# Patient Record
Sex: FEMALE | Race: WHITE
[De-identification: names, ages, dates, MRNs, and addresses within clinical notes are randomized per-mention and may not be internally consistent; named-entity substitution may affect disease eponyms.]

---

## 2020-08-17 ENCOUNTER — HOSPITAL ENCOUNTER (INPATIENT)
Dept: HOSPITAL 19 - LDRO | Age: 30
LOS: 2 days | Discharge: HOME | End: 2020-08-19
Payer: COMMERCIAL

## 2020-08-17 VITALS — DIASTOLIC BLOOD PRESSURE: 70 MMHG | HEART RATE: 65 BPM | SYSTOLIC BLOOD PRESSURE: 138 MMHG

## 2020-08-17 VITALS — DIASTOLIC BLOOD PRESSURE: 76 MMHG | HEART RATE: 72 BPM | TEMPERATURE: 98.2 F | SYSTOLIC BLOOD PRESSURE: 137 MMHG

## 2020-08-17 VITALS — DIASTOLIC BLOOD PRESSURE: 71 MMHG | HEART RATE: 71 BPM | SYSTOLIC BLOOD PRESSURE: 139 MMHG

## 2020-08-17 VITALS — DIASTOLIC BLOOD PRESSURE: 40 MMHG | SYSTOLIC BLOOD PRESSURE: 140 MMHG | HEART RATE: 78 BPM

## 2020-08-17 VITALS — DIASTOLIC BLOOD PRESSURE: 65 MMHG | HEART RATE: 72 BPM | SYSTOLIC BLOOD PRESSURE: 140 MMHG

## 2020-08-17 VITALS — HEART RATE: 77 BPM | DIASTOLIC BLOOD PRESSURE: 69 MMHG | SYSTOLIC BLOOD PRESSURE: 144 MMHG

## 2020-08-17 VITALS — DIASTOLIC BLOOD PRESSURE: 63 MMHG | SYSTOLIC BLOOD PRESSURE: 136 MMHG | HEART RATE: 68 BPM

## 2020-08-17 VITALS — WEIGHT: 253.53 LBS | HEIGHT: 64.06 IN | BODY MASS INDEX: 43.28 KG/M2

## 2020-08-17 VITALS — SYSTOLIC BLOOD PRESSURE: 140 MMHG | DIASTOLIC BLOOD PRESSURE: 69 MMHG | HEART RATE: 68 BPM

## 2020-08-17 VITALS — DIASTOLIC BLOOD PRESSURE: 82 MMHG | SYSTOLIC BLOOD PRESSURE: 144 MMHG | HEART RATE: 88 BPM

## 2020-08-17 VITALS — HEART RATE: 81 BPM | SYSTOLIC BLOOD PRESSURE: 140 MMHG | DIASTOLIC BLOOD PRESSURE: 45 MMHG

## 2020-08-17 VITALS — TEMPERATURE: 98.7 F | SYSTOLIC BLOOD PRESSURE: 135 MMHG | HEART RATE: 80 BPM | DIASTOLIC BLOOD PRESSURE: 80 MMHG

## 2020-08-17 VITALS — SYSTOLIC BLOOD PRESSURE: 130 MMHG | DIASTOLIC BLOOD PRESSURE: 66 MMHG | HEART RATE: 69 BPM | TEMPERATURE: 98.4 F

## 2020-08-17 VITALS — HEART RATE: 69 BPM | DIASTOLIC BLOOD PRESSURE: 75 MMHG | SYSTOLIC BLOOD PRESSURE: 135 MMHG

## 2020-08-17 VITALS — HEART RATE: 87 BPM | SYSTOLIC BLOOD PRESSURE: 141 MMHG | DIASTOLIC BLOOD PRESSURE: 84 MMHG

## 2020-08-17 VITALS — DIASTOLIC BLOOD PRESSURE: 77 MMHG | SYSTOLIC BLOOD PRESSURE: 127 MMHG | HEART RATE: 75 BPM

## 2020-08-17 VITALS — DIASTOLIC BLOOD PRESSURE: 57 MMHG | SYSTOLIC BLOOD PRESSURE: 127 MMHG | HEART RATE: 75 BPM

## 2020-08-17 VITALS — TEMPERATURE: 98.1 F | HEART RATE: 75 BPM | DIASTOLIC BLOOD PRESSURE: 73 MMHG | SYSTOLIC BLOOD PRESSURE: 130 MMHG

## 2020-08-17 VITALS — SYSTOLIC BLOOD PRESSURE: 135 MMHG | HEART RATE: 80 BPM | DIASTOLIC BLOOD PRESSURE: 71 MMHG

## 2020-08-17 VITALS — HEART RATE: 75 BPM | DIASTOLIC BLOOD PRESSURE: 76 MMHG | SYSTOLIC BLOOD PRESSURE: 127 MMHG

## 2020-08-17 DIAGNOSIS — O34.211: Primary | ICD-10-CM

## 2020-08-17 DIAGNOSIS — J45.909: ICD-10-CM

## 2020-08-17 DIAGNOSIS — E03.9: ICD-10-CM

## 2020-08-17 DIAGNOSIS — Z3A.38: ICD-10-CM

## 2020-08-17 DIAGNOSIS — E66.9: ICD-10-CM

## 2020-08-17 DIAGNOSIS — K21.9: ICD-10-CM

## 2020-08-17 LAB
BASOPHILS # BLD: 0 10*3/UL (ref 0–0.2)
BASOPHILS NFR BLD AUTO: 0.2 % (ref 0–2)
EOSINOPHIL # BLD: 0.1 10*3/UL (ref 0–0.7)
EOSINOPHIL NFR BLD: 0.9 % (ref 0–4)
ERYTHROCYTE [DISTWIDTH] IN BLOOD BY AUTOMATED COUNT: 15.8 % (ref 11.5–14.5)
GRANULOCYTES # BLD AUTO: 71.7 % (ref 42.2–75.2)
HCT VFR BLD AUTO: 37 % (ref 37–47)
HGB BLD-MCNC: 11.5 G/DL (ref 12.5–16)
LYMPHOCYTES # BLD: 2.4 10*3/UL (ref 1.2–3.4)
LYMPHOCYTES NFR BLD: 21 % (ref 20–51)
MCH RBC QN AUTO: 26 PG (ref 27–31)
MCHC RBC AUTO-ENTMCNC: 31 G/DL (ref 33–37)
MCV RBC AUTO: 82 FL (ref 80–100)
MONOCYTES # BLD: 0.6 10*3/UL (ref 0.1–0.6)
MONOCYTES NFR BLD AUTO: 5.7 % (ref 1.7–9.3)
NEUTROPHILS # BLD: 8.1 10*3/UL (ref 1.4–6.5)
PLATELET # BLD AUTO: 186 K/MM3 (ref 130–400)
PMV BLD AUTO: 10.5 FL (ref 7.4–10.4)
RBC # BLD AUTO: 4.49 M/MM3 (ref 4.1–5.3)

## 2020-08-17 NOTE — NUR
FHR baseline 135-140bpm and subtle late deceleration noted after contraction
and returning to baseline.
1015: FHR tracing moderate variability with recurrent late decelerations and
Dr. Damon at nurses station reviewing FHR strip and no new orders given.
 
1102: Patient off monitor to void.
 
1150: Lorenzo DILL at bedside discussing plan and risk factors.
 
1155: Patient off monitor and ambulates to OR.

## 2020-08-17 NOTE — NUR
Patient ambulatory to [facility]5 with spouse, changed into gown, FHR/TOCO monitors
placed and explained. Patient states that she has been heber 0200 am.
Denies any leaking of fluid/vaginal bleeding/decreased fetal movement.
Assessment completed and questions answered.

## 2020-08-17 NOTE — NUR
Dr. Damon at nurses station and updated on patient. See physician notification.
Physician reviewing FHR strip and orders to watch patient longer until any
further orders.
 
1010: Dr. Damon at bedside and discussing the plan for  and patient
agrees with plan of care.
1015: IV started per Olinda WALL, blood drawn and to lab, LR infusing.
Consents gone over and signed/postpartum packet given/plan of care discussed.
 
Patient prepped for .

## 2020-08-18 VITALS — HEART RATE: 72 BPM | SYSTOLIC BLOOD PRESSURE: 122 MMHG | DIASTOLIC BLOOD PRESSURE: 61 MMHG

## 2020-08-18 VITALS — SYSTOLIC BLOOD PRESSURE: 123 MMHG | HEART RATE: 77 BPM | TEMPERATURE: 98.4 F | DIASTOLIC BLOOD PRESSURE: 57 MMHG

## 2020-08-18 VITALS — HEART RATE: 68 BPM | SYSTOLIC BLOOD PRESSURE: 120 MMHG | DIASTOLIC BLOOD PRESSURE: 58 MMHG | TEMPERATURE: 97.6 F

## 2020-08-18 VITALS — DIASTOLIC BLOOD PRESSURE: 49 MMHG | HEART RATE: 62 BPM | TEMPERATURE: 97.7 F | SYSTOLIC BLOOD PRESSURE: 94 MMHG

## 2020-08-18 VITALS — SYSTOLIC BLOOD PRESSURE: 138 MMHG | HEART RATE: 59 BPM | DIASTOLIC BLOOD PRESSURE: 58 MMHG | TEMPERATURE: 97.9 F

## 2020-08-18 NOTE — NUR
Initial visit; Parents thanked  for offering congratulations and God's
blessings for the birth of their daughter.  thanked family for
choosing [facility].

## 2020-08-19 VITALS — DIASTOLIC BLOOD PRESSURE: 68 MMHG | HEART RATE: 77 BPM | TEMPERATURE: 97.6 F | SYSTOLIC BLOOD PRESSURE: 116 MMHG
